# Patient Record
Sex: FEMALE | URBAN - METROPOLITAN AREA
[De-identification: names, ages, dates, MRNs, and addresses within clinical notes are randomized per-mention and may not be internally consistent; named-entity substitution may affect disease eponyms.]

---

## 2019-08-01 ENCOUNTER — TELEPHONE (OUTPATIENT)
Dept: ONCOLOGY | Age: 49
End: 2019-08-01

## 2019-08-23 ENCOUNTER — TELEPHONE (OUTPATIENT)
Dept: ONCOLOGY | Age: 49
End: 2019-08-23

## 2019-11-11 ENCOUNTER — TELEPHONE (OUTPATIENT)
Dept: ONCOLOGY | Age: 49
End: 2019-11-11

## 2020-02-04 ENCOUNTER — TELEPHONE (OUTPATIENT)
Dept: ONCOLOGY | Age: 50
End: 2020-02-04

## 2020-02-04 NOTE — TELEPHONE ENCOUNTER
Left message on voicemail advising patient of her missed appointment on 2/4/2020, 2nd no show letter sent out

## 2022-12-22 ENCOUNTER — HOME HEALTH ADMISSION (OUTPATIENT)
Dept: HOME HEALTH SERVICES | Facility: HOME HEALTH | Age: 52
End: 2022-12-22
Payer: COMMERCIAL

## 2022-12-24 ENCOUNTER — HOME CARE VISIT (OUTPATIENT)
Dept: HOME HEALTH SERVICES | Facility: HOME HEALTH | Age: 52
End: 2022-12-24
Payer: COMMERCIAL

## 2022-12-26 ENCOUNTER — HOME CARE VISIT (OUTPATIENT)
Dept: SCHEDULING | Facility: HOME HEALTH | Age: 52
End: 2022-12-26
Payer: COMMERCIAL

## 2022-12-26 VITALS
HEART RATE: 93 BPM | RESPIRATION RATE: 14 BRPM | OXYGEN SATURATION: 99 % | DIASTOLIC BLOOD PRESSURE: 80 MMHG | TEMPERATURE: 98.7 F | SYSTOLIC BLOOD PRESSURE: 130 MMHG

## 2022-12-26 PROCEDURE — G0151 HHCP-SERV OF PT,EA 15 MIN: HCPCS

## 2022-12-26 NOTE — HOME HEALTH
PMHx: List of Comorbidities: Anxiety, DM, HLD, GERD, HTN, Anemia, Hypothyroidism, back muscle spasms   Pt went into the hospital in Nov for sepsis and had a R AKA performed. Pt reported that she was in the hospital for a couple of weeks then went to Rehab for 4 weeks and came home last Fri    SUBJECTIVE: Pt and kaye reprort that pt came home in regular car and pt kaye bumped her up the front steps in [place].  reports that he is planning on building a ramp. Pt noted that she has 2 MD appointments this week and pt tabatha and boyfriend are going to assit getting her up and down the steps in [place]   LIVING SITUATION:  Pt lives with kaye in single level home with 4 steps to enter with 2 railings. Pt  works during the day, so pt is left a home for periods of time. Pt daughter and friends do call and check on pt   REQUIRES CAREGIVER ASSISTANCE FOR: transportation, medications, ADLS, IADLS   PLOF: Pt was amb with RW and cane. Pt was I with dressing and bathing  MEDICATIONS REVIEWED AND RECONCILED  Medications reconciled . Pt reports that the list for medications that she was given from the rehab is at the pharmacy She does have the floowing meds that are long standing meds in the home Flexeril, cymbalta, lyrica, ultram and ambien. Pt reports that she also takes insulin but as run out and does not have any in the home. She does not know if the Rehab has orderd her any medicaitons for pain. Pt  stated that he is going to  medicaitons today. There was a severe drug interaction between pt flexeril and tramadol  I called Dr Carmelina Page office and spoke with Ranjeet notifed her of the severe drug interaction and that pt did not have a list of the DC meds from the Rehab facility and that pt  has not yet picked any medications up from the pharmacy so I am not sure what medications pt should be taking.  Ranjeet stated that she would notify the nurse and they would call me back    NEXT MD APPT:  12/30/22 Dr Yolie De León vascular on 12/27/22 to have staples removed   ROM: R Risidual limb WFL L LE WFl except L foot pt has a Charcot foot was planning to have surgery on it when all this happened  STRENGTH: R hip flexors, extensors, ADD/ABD 4/5  L hip  flexors -3/5 L quads and hamstrings 3/5   WOUNDS:R risidual limb incision open to air. staples are intact along the incision line, no drainage noted. Slight erythema noted along lateral aspect on incision. Pt does have +2 pitting edema noted in L LE   BED MOBILITY:supine to sit and sit to supine with S   TRANSFERS:sliding board transfer bed<> wc with S/SBA increased time needed to complete task. Pt was I with sliding board management . Attempted sit to stand transfer with RW pt was unable to perform secondary to fear of falling, she did not have her L shoe available at time of transfer. Recommeded that it be used on next attempt   WC SKILL: Pt is I with locking and unlocking breaks. Pt was I with swing away arms rests. Pt was I in self propelling wc from bedroom to kitchen using B UE to self propel wc increased time needed to complete task. Pt L LE does not touch the floor when sitting in the WC as pt has 2 cushions in chair. Recommeded the removal of one of the cushions to see if L LE would then touch the floor. Pt refused at this time. Pt was unable to self propel wc to front door secondary to increased clutter in living room. Recommeded for safety that pathway be made so wc and fit through. Recommeded for safety that clutter near pt bed to moved so that she has more room to get wc in closer to bed without having to fight clutter   STAIRS:NA Family bumbs pt up and down steps in [place]   BALANCE: Pt was able to maintain a static symmetrical sitting posture on the EOB with U UE support   PATIENT RESPONE TO TX: Pt pain level remained the same throughout tx session   PATIENT LEVEL OF UNDERSTANDING OF EDUCATION PROVIDED Educated pt to use wc as primary means of mobilty.  Recommeded that pt carry cellphone with her at all times for safety   PATIENT EDUCATION PROVIDED THIS VISIT: safety, HEP, walking, deep breathing, Recommeded that all pathways pt uses to be free and clear of clutter and to make a pathway to front door so pt is able to self propell herself to it for safety. Recommeded that pt elevate B LE throughout the day to assist with swelling. Discussed pressure relieving techniques and changing postion every Hr . Reinforced the improtance of picking pt meds up today from the pharmacy   HEP consisting of:  1. Change positon every HR for pressure relief. 2. supine therex L LE AP,QS,HS,Hamstrings sets, SAQ, gluteal squeezes 3 daily x 10  Written HEP issued, patient/caregiver verbalized understanding. CONTINUED NEED FOR THE FOLLOWING SKILLS: HH PT is medically necessary to *address pain, decreased ROM, decreased strength, increased swelling, impaired bed mobility, decreased independence with functional transfers, , and impaired balance in order to improve functional independence, quality of life, return to PLOF, and reduce the risk for falls. ASSESSMENT: Pt presents with decreased stength B LE. A needed for all transfers. Pt is able to self propel wc in home limited distances secondary to clutter recommeded that pathways cleared of clutter. Pt is currently unable to stand to assist with presure relief and transfers. OT has been orderd to assess ADLS and IADLS . SN has been ordered to assist with medication managment, educated in disease managment  PLAN: Pt will be seen to establish and upgrade HEP. Bed mobility training, transfer training. Reinforece general safety precautions. DISCHARGE PLANNING DISCUSSED: Discharge to self and family under MD supervision once all goals have been met or patient has reached max potential. Patient/caregiver verbalized understanding. Elvin Peralta NP and .  Medications reconciled . Pt reports that the list for medications that she was given from the rehab is at the pharmacy She does have the floowing meds that are long standing meds in the home Flexeril, cymbalta, lyrica, ultram and ambien. Pt reports that she also takes insulin but as run out and does not have any in the home. She does not know if the Rehab has orderd her any medicaitons for pain. Pt  stated that he is going to  medicaitons today. There was a severe drug interaction between pt flexeril and tramadol  I called Dr Miky Lopez office and spoke with Ranjeet notifed her of the severe drug interaction and that pt did not have a list of the DC meds from the Rehab facility and that pt  has not yet picked any medications up from the pharmacy so I am not sure what medications pt should be taking. Ranjeet stated that she would notify the nurse and they would call me back. Pt presents with decreased stength B LE. A needed for all transfers. Pt is able to self propel wc in home limited distances secondary to clutter recommeded that pathways cleared of clutter. Pt is currently unable to stand to assist with presure relief and transfers. OT has been orderd to assess ADLS and IADLS .  SN has been ordered to assist with medication managment, educated in disease management

## 2022-12-26 NOTE — Clinical Note
Discussed PLOC with LPTA working on increasing strength B LE. Work on bed mobility skills. Work on transfer training  to include stand piviot transfers. Work on standing balance with RW for pressure relief.  Educate and reinforce safety awareness

## 2022-12-27 ENCOUNTER — HOME CARE VISIT (OUTPATIENT)
Dept: HOME HEALTH SERVICES | Facility: HOME HEALTH | Age: 52
End: 2022-12-27
Payer: COMMERCIAL

## 2022-12-28 ENCOUNTER — HOME CARE VISIT (OUTPATIENT)
Dept: SCHEDULING | Facility: HOME HEALTH | Age: 52
End: 2022-12-28
Payer: COMMERCIAL

## 2022-12-28 PROCEDURE — G0157 HHC PT ASSISTANT EA 15: HCPCS

## 2022-12-28 NOTE — Clinical Note
thanks for the update  ----- Message -----  From: Tristan Busby PTA  Sent: 12/29/2022   7:26 AM EST  To: Betito Tracy PTA, Allyn Capone, PT      The pt had 2 falls since Monday, Also, her BP was 180/90 today. I spoke with Claude Jordan at Dr. Morteza Culver office to alert of both issues. She did have to reschedule her appt with him from Friday to next week as her  could not get off work.

## 2022-12-28 NOTE — Clinical Note
added. Thanks   ----- Message -----  From: Roslyn Peace PTA  Sent: 12/29/2022   7:26 AM EST  To: Claire Morales, PT      Losartan Potassium 25 MG take 1 tablet by mouth once daily for HTN  Ferrous Sulfate 325 MG tablet take 1 tablet by mouth twice a day  Furosemide 20 MG tablet take 1 tablet by mouth every day  Duloxetine HCL DR 60 MG capsule take 1 capsule by mouth every day  Atorvastatin 40 MG tablet take 1 tablet by mouth everyday at bedtime  Vitamin D2 1.25 MG (50,000Unit) Take 1 capsule by mouth every Sunday for Vitamin deficiency  Doxycycline Hyclate 100 MG capsule Take 1 tablet by mouth twice daily  Sodium Bicarb 650 MG tablet Give 1 talet by mouth twice a day for indigestion  Levothyroxine MCG tablet Take 1 tablet by mouth everyday  Vitamin B-1 100 MG Tablet Take 1 tablet by mouth every day for vitamin deficiency  Cephalexin 500 MG capsule Take 1 tablet by mouth every 12 hours

## 2022-12-28 NOTE — Clinical Note
The pt had 2 falls since Monday, Also, her BP was 180/90 today. I spoke with Marj Mistry at Dr. Darian Bee office to alert of both issues. She did have to reschedule her appt with him from Friday to next week as her  could not get off work.

## 2022-12-29 VITALS
TEMPERATURE: 97.7 F | DIASTOLIC BLOOD PRESSURE: 90 MMHG | SYSTOLIC BLOOD PRESSURE: 180 MMHG | OXYGEN SATURATION: 99 % | HEART RATE: 86 BPM

## 2022-12-29 NOTE — HOME HEALTH
SUBJECTIVE: The pt reports 2 falls since the last visit. One fall when she was transferring to the car. She states she attempted a stand/pivot transfer vs a sliding board transfer and her LLE gave way. She was able to get up with the assistance of her family. No injury. The other fall was when she was transferring from the toilet bench to the [facility]. She grabbed on to the shower door vs pushing from the bench and the shower door moved. She states she was able to get up with the assistance of her . No injury. Bridget Coy PAIN: see pain assessment  OBJECTIVE: see interventions  . NEXT MD APPT: next week with Dr. Karina Bernabe. Bridget Coy CAREGIVER ASSISTANCE NEEDED FOR: The pt lives with her  and has a daughter that lives nearby to assist with care as needed: medication management, transportation, shopping, meals, safety. .  ASSESSMENT AND PROGRESS TOWARD GOALS:  The pt demonstrated a fair result in HHPT on this date due to limited participation from increased BP and self limiting characteristics as she refused to review the toilet transfer to review the improved safety awareness. She now has her medications to assist with BP management and is able to perform transfers out of the bed with the sliding board with S. She does have some difficulty with removing the board from under her. She will benefit from continued HHPT to improve the safety with functional mobility to reduce future risks of falls and rehospitalizations. PATIENT RESPONSE TO TREATMENT: Improved medication awareness post education. PATIENT LEVEL OF UNDERSTANDING OF EDUCATION: Good - the pt able to teach back the safety of NOT using the shower door to assist with transfers to and from the toilet bench.    .  CONTINUED NEED FOR THE FOLLOWING SKILLS: HH PT is medically necessary to address  pain, decreased ROM, decreased strength, increased swelling, impaired bed mobility, decreased independence with functional transfers, impaired gait, impaired stair negotiation, and impaired balance in order to improve functional independence, quality of life, return to PLOF, reduce the risk for falls, and reduce pain. [place] LAST COMPLETED ON:  The pts status and POC discussed with Hugo Kelley PT 12/27/22 and today. Stacey Copping PLAN: Plan to review tranfers next visit. PFA with Hugo Kelley PT.   DISCHARGE PLANNING DISCUSSED: Discharge to self and family under MD supervision once all goals have been met or patient has reached maximum potential. Discussed with the pt the POC to continue HHPT with the remaining frequency of 2w3. The pt is in agreement to the POC at this time.

## 2022-12-29 NOTE — CASE COMMUNICATION
Losartan Potassium 25 MG take 1 tablet by mouth once daily for HTN  Ferrous Sulfate 325 MG tablet take 1 tablet by mouth twice a day  Furosemide 20 MG tablet take 1 tablet by mouth every day  Duloxetine HCL DR 60 MG capsule take 1 capsule by mouth every day  Atorvastatin 40 MG tablet take 1 tablet by mouth everyday at bedtime  Vitamin D2 1.25 MG (50,000Unit) Take 1 capsule by mouth every Sunday for Vitamin deficiency  Doxycycline Hyclat e 100 MG capsule Take 1 tablet by mouth twice daily  Sodium Bicarb 650 MG tablet Give 1 talet by mouth twice a day for indigestion  Levothyroxine MCG tablet Take 1 tablet by mouth everyday  Vitamin B-1 100 MG Tablet Take 1 tablet by mouth every day for vitamin deficiency  Cephalexin 500 MG capsule Take 1 tablet by mouth every 12 hours

## 2022-12-31 ENCOUNTER — HOME CARE VISIT (OUTPATIENT)
Dept: HOME HEALTH SERVICES | Facility: HOME HEALTH | Age: 52
End: 2022-12-31
Payer: COMMERCIAL

## 2022-12-31 NOTE — CASE COMMUNICATION
I called last night about appointment today for SN Initail assessment. We agreed on 12:30-1:30 PM as time for visit. I arrived at 13:15 and no answer at the door. I called both numbers avalable to me and left messages, no return call.  told.

## 2023-01-01 ENCOUNTER — HOME CARE VISIT (OUTPATIENT)
Dept: SCHEDULING | Facility: HOME HEALTH | Age: 53
End: 2023-01-01
Payer: COMMERCIAL

## 2023-01-01 PROCEDURE — G0299 HHS/HOSPICE OF RN EA 15 MIN: HCPCS

## 2023-01-02 VITALS
OXYGEN SATURATION: 99 % | HEART RATE: 78 BPM | SYSTOLIC BLOOD PRESSURE: 140 MMHG | DIASTOLIC BLOOD PRESSURE: 80 MMHG | RESPIRATION RATE: 16 BRPM | TEMPERATURE: 98.6 F

## 2023-01-02 NOTE — HOME HEALTH
Caregiver involvement:  is caregiver, he is available most days and all evenings, he  Assists with ADLs, Medication management, Transportation to appointments, Meal prep and assists with transfers. .    Medications reconciled and all medications are available in the home this visit. The following education was provided regarding medications,  Reminded to take all prescribed meds as directed and at the same time each day. Medications  are effective at this time. Home health supplies by type and quantity ordered/delivered this visit include: n/a    Patient education provided this visit:I reviewed the importance of regular blood sugar monitoring for good blood sugar control. Patient instructed to follow with diabetic diet- monitoring sugar intake, limiting foods with high sugar content. MEDICATION ADHERENCE IS AN IMPORTANT COMPONENT OF HTN MANAGEMENT. PATIENT STATES THE IMPORTANCE TO TAKE HTN MEDS SAME TIME EACH DAY. Continue a heart Healthy ADA diet. Watch out for high sodium foods, read labels. Observe for signs of infection. Monitor B/P, take meds as ordered and f/u with PCP. Read labels of foods, stay away from high sodium canned foods and processed meats. Patient is a fall risk, went over the need of having someone with her when transferring with slide board, keep hallways and living areas free of clutter and throw rugs. Educated patient about the quitting smoking, that it is bad for your health and respiratory status. Cigarette smoke harms nearly every organ in your body, causes many diseases, and reduces the health of smokers in general.      Progress toward goals:BS under good control, Right AKA site is healed, no dressings or wound care needed. Home exercise program: Discussed s/s of infection to monitor for, s/s of UTI, who to report to/when. Instructed cg to notify staff/md/seek tx if complications occur.  Patient instructed to maintain clear pathways in home and to minimize clutter to prevent falls from occurring/minimize fall potential.   Patient needing a well balanced diet with the 5 food groups, patient to increase fiber in diet, fresh fruits and vegetables, whole grains and increase water intake. Needs the assistance of one other for safe transfers to bed/chair etc.  Maintain healthy low sodium ADA diet, Continue to monitor B/P and Blood sugars, Observe for signs of infection. Work with PT to increase strength and f/u with PCP. I went over the importance of taking all prescriptions as ordered. I discussed how to avoid extra sodium in her diet. We discussed the signs of infection and when to call MD.  We discussed the high risk for falls and ways to prevent falls in the future. We discussed taking B/P daily and keeping a log. We also discussed the need of a heart healthy ADA diet, and the need to change positions frequently. Gaining strength with PT for increased mobility. Patient is on slididng scale with Humalog I went over the need to understand the signs of hypoglycemia and hyperglycemia, also to check sugar on a daily basis. Rotate injections to different areas of body, dispose of syringes in a non-porus container. Clinician instructed patient/CG on proper disposal of sharps: Containers should be made of hard plastic, be puncture-resistant and leakproof, such as a laundry detergent or bleach bottle.  When the container is ¾ full, it should be sealed with tape and labeled. DO NOT RECYCLE prior to discarding in the regular trash. Amaya Irvin is also taking Ultram for pain, I  went over the danger of opioid's addictive nature as well as propensity for respiratory depression. I contacted Dr. Manuel Isaacs by Fax to let hime know SN Initial Assessment was completed. Continued need for the following skills: Nursing, Physical Therapy and Occupational Therapy    The following discharge planning was discussed with the pt/caregiver:  Will discharge from nursing when education is complete and patient is medically stable.

## 2023-01-04 ENCOUNTER — HOME CARE VISIT (OUTPATIENT)
Dept: SCHEDULING | Facility: HOME HEALTH | Age: 53
End: 2023-01-04
Payer: COMMERCIAL

## 2023-01-04 PROCEDURE — G0157 HHC PT ASSISTANT EA 15: HCPCS

## 2023-01-05 VITALS
SYSTOLIC BLOOD PRESSURE: 158 MMHG | HEART RATE: 92 BPM | OXYGEN SATURATION: 98 % | DIASTOLIC BLOOD PRESSURE: 80 MMHG | TEMPERATURE: 97.4 F

## 2023-01-05 NOTE — HOME HEALTH
SUBJECTIVE: The pt reports her electricity has been shut off due to lack of payment. Kishore Martha PAIN: see pain assessment    OBJECTIVE: see interventions  . NEXT MD APPT: [facility], the pt states that her appt that was rescheduled for this week has been canceled. Kishore Martha CAREGIVER ASSISTANCE NEEDED FOR: The pt lives with her  and has a daughter that lives nearby to assist with care as needed: medication management, transportation, shopping, meals, safety. .    ASSESSMENT AND PROGRESS TOWARD GOALS: The pt continues to be at increased risks of falls due to environmental conditions. Her home is filled with clutter and now does not have proper lighting. She did demonstrate an improvement with bed mobility and the ability to participate in ther exs without an increase in pain. She demonstrated good technique with the bed<>WC squat/pivot transfers on this date. She may benefit from a MSW due to the electricity being shut off for possible community resources to assist.     PATIENT RESPONSE TO TREATMENT: no increase in pain with ther exs    PATIENT LEVEL OF UNDERSTANDING OF EDUCATION: Good, but no effort being made for the reduction of a smoking cessation at this time. .    CONTINUED NEED FOR THE FOLLOWING SKILLS: HH PT is medically necessary to address  pain, decreased ROM, decreased strength, increased swelling, impaired bed mobility, decreased independence with functional transfers, impaired gait, impaired stair negotiation, and impaired balance in order to improve functional independence, quality of life, return to PLOF, reduce the risk for falls, and reduce pain. Kishore Martha PLAN:  PFA with Jagdish Sanchez PT.     DISCHARGE PLANNING DISCUSSED: Discharge to self and family under MD supervision once all goals have been met or patient has reached maximum potential. Discussed with the pt the POC to continue HHPT with the remaining frequency of 2w3. The pt is in agreement to the POC at this time.

## 2023-01-06 ENCOUNTER — HOME CARE VISIT (OUTPATIENT)
Dept: HOME HEALTH SERVICES | Facility: HOME HEALTH | Age: 53
End: 2023-01-06
Payer: COMMERCIAL

## 2023-01-06 ENCOUNTER — HOME CARE VISIT (OUTPATIENT)
Dept: SCHEDULING | Facility: HOME HEALTH | Age: 53
End: 2023-01-06
Payer: COMMERCIAL

## 2023-01-06 VITALS
OXYGEN SATURATION: 98 % | RESPIRATION RATE: 14 BRPM | SYSTOLIC BLOOD PRESSURE: 126 MMHG | TEMPERATURE: 97.3 F | DIASTOLIC BLOOD PRESSURE: 70 MMHG | HEART RATE: 90 BPM

## 2023-01-06 PROCEDURE — G0151 HHCP-SERV OF PT,EA 15 MIN: HCPCS

## 2023-01-06 NOTE — HOME HEALTH
Date and Time of Fall: 12/27/22  SOC/JV Date: 12/26/22  Fall observed by [facility] Staff? no   Describe Event and Document any re-training or treatment plan modifications indicated:  She states she attempted a stand/pivot transfer vs a sliding board transfer and her LLE gave way. She was able to get up with the assistance of her family. No injury. The other fall was when she was transferring from the toilet bench to the [facility]. She grabbed on to the shower door vs pushing from the bench and the shower door moved. She states she was able to get up with the assistance of her . No injury. Response to re-training or treatment plan modifications: Educated pt to use sliding board for car transfers for safety. Also educated pt not to hold not to hold on to shower door when transfering as this was a safety issuse as pt moves. Pt verbalized understanding   Assisted Devices used by patient prior to fall: WC and sliding board   Was equipment in use at time of fall?  wc,sliding board   Injury (Yes / No), If yes, describe:  No   Emergent Care Received: (Yes / No), if yes, describe: NO  Was patient identified as High Risk for falls? Yes   List Tests Performed, Scores of Tests, and Patient Risk Factors: [facility] MARIA FERNANDA JOSE Notified (name and time): Dr. Saul Minor  office was notifed of fall on 12/28/22 by LPTA she spoke with [place]. SUBJECTIVE: I am doing ok. No recent fall. Pt requested a MSW consult to assist with community resources to assist with community resources as pt heat was recently cut off. I told her that I would request orders   REQUIRES CAREGIVER ASSISTANCE FOR: transportation, ADLS, IADLS   MEDICATIONS REVIEWED AND RECONCILED no changes   NEXT MD APPT: Dr. Saul Minor TBD   ROM:B LE WFL   STRENGTH:   R hip throughout 4+/5  L hip flex 4/5  L knne exr 4-/5  L knee flex 4+/5  L hip add/add 4+/5    WOUNDS: staples removed last week. R residual limb incison line intact well approximated. No signs or symptoms of infection noted.   BED MOBILITY:supine<> sit MOD I  TRANSFERS:Modified bump transfer bed<> wc MOD I demostrated proper technique and sequencing   WC SKILLS : pt was able to I self propell wc using B UE 50ft x 2 from bedroom to the front door and back. Pt was I turning wc around and I with locking and unlocking breaks   STAIRS:NA  BALANCE: Pt was able to maintain a static symetrical sitting posture on the EOB without B UE support MOD I. Pt refused static standing today with walker secondary to pain in L LE. PATIENT RESPONE TO TX: Pt pain level remained the same throughout tx session   PATIENT LEVEL OF UNDERSTANDING OF EDUCATION PROVIDED  Pt pain level remained the same troughout tx sessiion   PATIENT EDUCATION PROVIDED THIS VISIT: safety, HEP, walking, deep breathing, Reinforced use of sliding board for all car transfers. Reinforced that pt is never to hold on the shower door to assist with transfers as this is a safety risks as door moved . Pt faith did clear clutter in pt room so wc fit closer to the bed and he did clear a pathway to the front door so it is able to propell her wc there     HEP consisting of:  1) change positions every 30 mins  2) APs frequently  3) smoking cessation  Written HEP issued, patient/caregiver verbalized understanding. CONTINUED NEED FOR THE FOLLOWING SKILLS: HH PT is medically necessary to address pain,, decreased strength, increased swelling, impaired, decreased independence with functional transfers, impaired gait, impaired stair negotiation, and impaired balance in order to improve functional independence, quality of life, return to PLOF, and reduce the risk for falls. ASSESSMENT:  Pt has made some progress in theapy. On [facility]  12/26/22 AROM was R Risidual limb WFL L LE WFl except L foot pt has a Charcot foot was planning to have surgery on it when all this happened.  On SOC strength was R hip flexors, extensors, ADD/ABD 4/5  L hip  flexors -3/5 L quads and hamstrings 3/5 Today on Re Assessment strength is R hip throughout 4+/5  L hip flex 4/5  L knne exr 4-/5  L knee flex 4+/5  L hip add/add 4+/5  On SOC bed mobility was supine to sit and sit to supine with S.  Today at Re Assessment bed mobility is supine<> sit MOD I. Goal has been met. On [facility] transfers were sliding board transfer bed<> wc with S/SBA increased time needed to complete task. Pt was I with sliding board management . Attempted sit to stand transfer with RW pt was unable to perform secondary to fear of falling, she did not have her L shoe available at time of transfer. Recommeded that it be used on next attempt . Today at Re Assessment transfers are Modified bump transfer bed<> wc MOD I demostrated proper technique and sequencing. On [facility] WC skills were  Pt is I with locking and unlocking breaks. Pt was I with swing away arms rests. Pt was I in self propelling wc from bedroom to kitchen using B UE to self propel wc increased time needed to complete task. Pt L LE does not touch the floor when sitting in the WC as pt has 2 cushions in chair. Recommeded the removal of one of the cushions to see if L LE would then touch the floor. Pt refused at this time. Pt was unable to self propel wc to front door secondary to increased clutter in living room. Recommeded for safety that pathway be made so wc and fit through. Recommeded for safety that clutter near pt bed to moved so that she has more room to get wc in closer to bed without having to fight clutter. Today at Re Assessment WC skill are pt was able to I self propell wc using B UE 50ft x 2 from bedroom to the front door and back. Pt was I turning wc around and I with locking and unlocking breaks . On SOC stairs were NA Family bumbs pt up and down steps in wc  Today on Re Assessment stairs are NA  On SOC Pt was able to maintain a static symmetrical sitting posture on the EOB with U UE support .   Today at Re Assessment balance is Pt was able to maintain a static symetrical sitting posture on the EOB without B UE support MOD I. Pt refused static standing today with walker secondary to pain in L LE. Pt goals have been updated. Pt would cont to benefit from HHPT services to cont to work on strength training B LE. Begin static standing training and to begin standing <> steps transfers all to assist with pt getting to MD appointment. Also to cont to reinforce general safety awareness. I will request orders for MSW to assist with community resources   PLAN: standing<> step transfers, static standing with RW. cont with strength training B LE   DISCHARGE PLANNING DISCUSSED: Discharge to self and family under MD supervision once all goals have been met or patient has reached max potential. Patient/caregiver verbalized understanding. No

## 2023-01-07 ENCOUNTER — HOME CARE VISIT (OUTPATIENT)
Dept: SCHEDULING | Facility: HOME HEALTH | Age: 53
End: 2023-01-07
Payer: COMMERCIAL

## 2023-01-07 PROCEDURE — G0299 HHS/HOSPICE OF RN EA 15 MIN: HCPCS

## 2023-01-09 ENCOUNTER — HOME CARE VISIT (OUTPATIENT)
Dept: SCHEDULING | Facility: HOME HEALTH | Age: 53
End: 2023-01-09
Payer: COMMERCIAL

## 2023-01-09 ENCOUNTER — HOME CARE VISIT (OUTPATIENT)
Dept: HOME HEALTH SERVICES | Facility: HOME HEALTH | Age: 53
End: 2023-01-09
Payer: COMMERCIAL

## 2023-01-09 VITALS
DIASTOLIC BLOOD PRESSURE: 72 MMHG | SYSTOLIC BLOOD PRESSURE: 120 MMHG | RESPIRATION RATE: 16 BRPM | TEMPERATURE: 97.2 F | HEART RATE: 80 BPM | OXYGEN SATURATION: 98 %

## 2023-01-09 VITALS
SYSTOLIC BLOOD PRESSURE: 150 MMHG | TEMPERATURE: 97.3 F | DIASTOLIC BLOOD PRESSURE: 80 MMHG | OXYGEN SATURATION: 7 % | HEART RATE: 90 BPM

## 2023-01-09 PROCEDURE — G0299 HHS/HOSPICE OF RN EA 15 MIN: HCPCS

## 2023-01-09 PROCEDURE — G0157 HHC PT ASSISTANT EA 15: HCPCS

## 2023-01-09 NOTE — HOME HEALTH
SUBJECTIVE:   Pt explains that there is still no heat or electricity and has no scheduled plans of getting that fixed. States honeybuns or poptarts are her choices for breakfast. Pt explains that surgery has not been scheduled and no current treatments for the L foot. Pt also explains that she has diarrhea but no vomiting. She complains of diarrhea and pain in abdomen. Chuck Mancuso PATIENT EDUCATION: pt education in regard to the need for following a diabetic diet. Chuck Mancuso PAIN: see pain assessment. OBJECTIVE: See interventions. Chuck LI MD APPT: [facility], the pt states that her appt that was rescheduled for this week has been canceled. Chuck Mancuso CAREGIVER ASSISTANCE NEEDED FOR: The pt lives with her  and has a daughter that lives nearby to assist with care as needed: medication management, transportation, shopping, meals, safety. .  ASSESSMENT AND PROGRESS TOWARD GOALS: Pt demonstrated a positive result in HHPT on this date as evidence of transfers from bed to [facility] with mod(I) and consistently performes bed mobility with mod(I), but continues to require mod(A) for Stand to sit to RW and CGA for stand to sit to the WC. The pt will benefit from continued HHPT to increase strength, balance, and edurance to reduce the risks of falls and overall burden of care. The pt will also benefit from continued education for improving her overall health. PATIENT RESPONSE TO TREATMENT: 7/10 pain level on the VAS in the LLE pre PT, 10/10 pain level on LLE with standing. PATIENT LEVEL OF UNDERSTANDING OF EDUCATION: Pt is understanding of diabetic diet needs, but does not show motivation for change.    .  CONTINUED NEED FOR THE FOLLOWING SKILLS: HH PT is medically necessary to address  pain, decreased ROM, decreased strength, decreased independence with functional transfers, impaired gait, impaired stair negotiation, and impaired balance in order to improve functional independence, quality of life, return to PLOF, reduce the risk for falls, and reduce pain. Billy Coffman PLAN:  Next visit will focus on balance and strength of LLE to reduce the risks of falls with transfers and improve the ability to stand at the walker for stand/pivot transfers. DISCHARGE PLANNING DISCUSSED: Discharge to self and family under MD supervision once all goals have been met or patient has reached maximum potential. Discussed with the pt the POC to continue HHPT with the remaining frequency of 1 more visti this week then 2w2. The pt is in agreement to the POC at this time.

## 2023-01-09 NOTE — HOME HEALTH
Caregiver involvement:  is caregiver, he is available most days and all evenings, he  Assists with ADLs, Medication management, Transportation to appointments, Meal prep and assists with transfers. .    Medications reconciled and all medications are available in the home this visit. The following education was provided regarding medications,  Reminded to take all prescribed meds as directed and at the same time each day. Medications  are effective at this time. Home health supplies by type and quantity ordered/delivered this visit include: n/a    Patient education provided this visit:I reviewed the importance of regular blood sugar monitoring for good blood sugar control. Patient instructed to follow with diabetic diet- monitoring sugar intake, limiting foods with high sugar content. MEDICATION ADHERENCE IS AN IMPORTANT COMPONENT OF HTN MANAGEMENT. PATIENT STATES THE IMPORTANCE TO TAKE HTN MEDS SAME TIME EACH DAY. Continue a heart Healthy ADA diet. Watch out for high sodium foods, read labels. Observe for signs of infection. Monitor B/P, take meds as ordered and f/u with PCP. Read labels of foods, stay away from high sodium canned foods and processed meats. Patient is a fall risk, went over the need of having someone with her when transferring with slide board, keep hallways and living areas free of clutter and throw rugs. Educated patient about the quitting smoking, that it is bad for your health and respiratory status. Cigarette smoke harms nearly every organ in your body, causes many diseases, and reduces the health of smokers in general.  PT has requested a MSW consult, as patient's power is off and there is no heat in home at present. Progress toward goals:BS under good control, Right AKA site is healed, no dressings or wound care needed. Home exercise program: Discussed s/s of infection to monitor for, s/s of UTI, who to report to/when.  Instructed cg to notify staff/md/seek tx if complications occur. Patient instructed to maintain clear pathways in home and to minimize clutter to prevent falls from occurring/minimize fall potential.   Patient needing a well balanced diet with the 5 food groups, patient to increase fiber in diet, fresh fruits and vegetables, whole grains and increase water intake. Needs the assistance of one other for safe transfers to bed/chair etc.  Maintain healthy low sodium ADA diet, Continue to monitor B/P and Blood sugars, Observe for signs of infection. Work with PT to increase strength and f/u with PCP. I went over the importance of taking all prescriptions as ordered. I discussed how to avoid extra sodium in her diet. We discussed the signs of infection and when to call MD.  We discussed the high risk for falls and ways to prevent falls in the future. We discussed taking B/P daily and keeping a log. We also discussed the need of a heart healthy ADA diet, and the need to change positions frequently. Gaining strength with PT for increased mobility. Patient is on slididng scale with Humalog I went over the need to understand the signs of hypoglycemia and hyperglycemia, also to check sugar on a daily basis. Rotate injections to different areas of body, dispose of syringes in a non-porus container. Clinician instructed patient/CG on proper disposal of sharps: Containers should be made of hard plastic, be puncture-resistant and leakproof, such as a laundry detergent or bleach bottle.  When the container is ¾ full, it should be sealed with tape and labeled. DO NOT RECYCLE prior to discarding in the regular trash. Melissa Waterman is also taking Ultram for pain, I  went over the danger of opioid's addictive nature as well as propensity for respiratory depression. Continued need for the following skills: Nursing, Physical Therapy and Occupational Therapy. The following discharge planning was discussed with the pt/caregiver:  Will discharge from nursing when education is complete and patient is medically stable.

## 2023-01-11 ENCOUNTER — HOME CARE VISIT (OUTPATIENT)
Dept: HOME HEALTH SERVICES | Facility: HOME HEALTH | Age: 53
End: 2023-01-11
Payer: COMMERCIAL

## 2023-01-11 ENCOUNTER — HOME CARE VISIT (OUTPATIENT)
Dept: SCHEDULING | Facility: HOME HEALTH | Age: 53
End: 2023-01-11
Payer: COMMERCIAL

## 2023-01-12 ENCOUNTER — HOME CARE VISIT (OUTPATIENT)
Dept: SCHEDULING | Facility: HOME HEALTH | Age: 53
End: 2023-01-12
Payer: COMMERCIAL

## 2023-01-12 PROCEDURE — G0299 HHS/HOSPICE OF RN EA 15 MIN: HCPCS

## 2023-01-13 VITALS
TEMPERATURE: 98.7 F | RESPIRATION RATE: 16 BRPM | SYSTOLIC BLOOD PRESSURE: 122 MMHG | OXYGEN SATURATION: 96 % | DIASTOLIC BLOOD PRESSURE: 74 MMHG | HEART RATE: 90 BPM

## 2023-01-13 NOTE — HOME HEALTH
Skilled reason for visit: nursing assessment, vs,pain, medications and living conditions      patients cg is spouse and he is available as needed or assistance with IADL's, ADL's, meal prep, medication management, and taking patient to all doctors appointments. Medications reviewed and all medications are available in the home this visit. The following education was provided regarding medications, medication interactions, and look alike medications (specify): insulin  Medications are effective at this time. Medications reconciled and all meds in home    Home health supplies by type and quantity ordered/delivered this visit include: PT [address]      Patient education provided this visit: pt has no electricity or heat. Pt is laying in bed, cold and unable to get food. Her  is at work during the day and doesnt get home till after 5pm she verb. She stated that when her  gets home, he brings her food. Pt stated they owed over $700 to the electric company and cant get it turned back on. Pt was eating honey buns and was smoking cigarettes. Pt made a comment that the last time they went a long time without electricity, so she has been down this road before. Patient level of understanding of education provided: pt is understanding of all procedures performed.      Skilled Care Performed this visit: vs, pain, meds, nursing assessment    Patient response to procedure performed: pt tolerated well    Agency Progress toward goals: will reassess next visit    Patient's Progress towards personal goals: pt is not making progress due to living conditions    Home exercise program: continue home exercises program as developed by physical therapy     Continued need for the following skills: Nursing and Physical Therapy    Plan for next visit: nursing assessment and possible discharge    Patient and/or caregiver notified and agrees to changes in the Plan of Care YES      The following discharge planning was discussed with the pt/caregiver: Patient will be discharged once education has completed, patient is medically stable and pt/cg are able to independently manage medications and disease process.

## 2023-01-15 VITALS
RESPIRATION RATE: 16 BRPM | TEMPERATURE: 97.5 F | DIASTOLIC BLOOD PRESSURE: 72 MMHG | HEART RATE: 88 BPM | OXYGEN SATURATION: 97 % | SYSTOLIC BLOOD PRESSURE: 122 MMHG

## 2023-01-15 NOTE — HOME HEALTH
Skilled reason for visit: discipline discharge, nursing assessment, vs,pain, medications and living conditions              patients cg is spouse and he is available as needed or assistance with IADL's, ADL's, meal prep, medication management, and taking patient to all doctors appointments. Medications reviewed and all medications are available in the home this visit. The following education was provided regarding medications, medication interactions, and look alike medications (specify): insulin, cymbalta, lasix, lyrica    Medications are effective at this time. Medications reconciled and all meds in home         Home health supplies by type and quantity ordered/delivered this visit include: n/a              Patient education provided this visit: see interventions       Patient level of understanding of education provided: pt is understanding of all procedures performed. Skilled Care Performed this visit: vs, pain, meds, nursing assessment and discipline dc         Patient response to procedure performed: pt tolerated well         Agency Progress toward goals: pt unable to perform goals due to living conditions        Patient's Progress towards personal goals: pt is not making progress due to living conditions         Home exercise program: continue home exercises program as developed by physical therapy          Continued need for the following skills:  Physical Therapy         Plan for next visit: n/a         Patient and/or caregiver notified and agrees to changes in the Plan of Care YES           The following discharge planning was discussed with the pt/caregiver: Patient will be discharged today.

## 2023-01-16 ENCOUNTER — HOME CARE VISIT (OUTPATIENT)
Dept: SCHEDULING | Facility: HOME HEALTH | Age: 53
End: 2023-01-16
Payer: COMMERCIAL

## 2023-01-16 PROCEDURE — G0152 HHCP-SERV OF OT,EA 15 MIN: HCPCS

## 2023-01-16 NOTE — Clinical Note
thanks for the update  ----- Message -----  From: Yan Dobson OT  Sent: 1/17/2023   8:47 AM EST  To: Andreina Hansen PTA, Elizabeth Nur RN, *      Occupational Therapy Evaluation    3w1, 2w1, 1w1    Ms. Barb Carpio is unable to ambulate due to cluttered walkways and unsafe walking surfaces in addition to her R AKA. She uses a w/c for all mobility and is able to perform squat pivot transfers from the bed to the w/c and sliding toilet bench over commode with CGA. Cues needed to ensure breaks are locked on w/c and to keep walkways clear from clutter to reduce falls. Ms. Barb Carpio reports that she has no electricity due to outstanding bill. Her spouse attaches a generator in the evenings (4pm-10pm) to use the electric stove, charge cell phone and use space heaters. She uses the electric stove to heat up water in the evenings for sponge bathing. Her spouse is at work during the day and pt is unable to perform full ADL at this time due to frigid water. She is agreeable to work with mock set up of her ADL with OT as she reports history of falls during self care but not since start of care. She reports that she needs assistance to wash sameer areas due to limited standing tolerance and balance. She is able to wipe self while seated on the commode. There is no sink in the bathroom. It has been removed to allow w/c access to the toilet. Ms. Barb Carpio reports that she requires assistance with LB ADL to include toileting, LB bathing and LB dressing due to limited space and decreased balance. Will provide ongoing skilled OT visits to assist pt with creating a routine that pt can carryover with increased safety and independence to reduce risk of falls and burden on caregiver.

## 2023-01-17 VITALS
RESPIRATION RATE: 17 BRPM | SYSTOLIC BLOOD PRESSURE: 136 MMHG | DIASTOLIC BLOOD PRESSURE: 88 MMHG | OXYGEN SATURATION: 100 % | TEMPERATURE: 97.3 F | HEART RATE: 97 BPM

## 2023-01-17 NOTE — CASE COMMUNICATION
Occupational Therapy Evaluation    3w1, 2w1, 1w1    Ms. Lisa Ortiz is unable to ambulate due to cluttered walkways and unsafe walking surfaces in addition to her R AKA. She uses a w/c for all mobility and is able to perform squat pivot transfers from the bed to the w/c and sliding toilet bench over commode with CGA. Cues needed to ensure breaks are locked on w/c and to keep walkways clear from clutter to reduce falls. Ms. Lisa Ortiz reports th at she has no electricity due to outstanding bill. Her spouse attaches a generator in the evenings (4pm-10pm) to use the electric stove, charge cell phone and use space heaters. She uses the electric stove to heat up water in the evenings for sponge bathing. Her spouse is at work during the day and pt is unable to perform full ADL at this time due to frigid water. She is agreeable to work with mock set up of her ADL with OT as she r eports history of falls during self care but not since start of care. She reports that she needs assistance to wash sameer areas due to limited standing tolerance and balance. She is able to wipe self while seated on the commode. There is no sink in the bathroom. It has been removed to allow w/c access to the toilet. Ms. Lisa Ortiz reports that she requires assistance with LB ADL to include toileting, LB bathing and LB dressing due to dominique ited space and decreased balance. Will provide ongoing skilled OT visits to assist pt with creating a routine that pt can carryover with increased safety and independence to reduce risk of falls and burden on caregiver.

## 2023-01-17 NOTE — HOME HEALTH
Caregiver involvement: Waldo (spouse) works during the day but is home in the evening to assist pt with ADL and mobility    Medications reviewed and all medications are available in the home this visit. The following education was provided regarding medications, medication interactions, and look alike medications (specify): Continue as directed by MD.    Medications  are effective at this time. Patient education provided this visit: see ADL note    Sharps education provided:  Clinician instructed patient/CG on proper disposal of sharps: Containers should be made of hard plastic, be puncture-resistant and leakproof,   such as a laundry detergent or bleach bottle.  When the container is ¾ full, it should be sealed with tape and labeled   DO NOT RECYCLE prior to discarding in the regular trash.        Patient level of understanding of education provided: see ADL note    Skilled Care Performed this visit: Completed OT evaluation and assessment for safety with ADL and mobility. Patient response to procedure performed:  Ms. Corie Stephens had a positive response to therapy. She denies increased pain with participation today. Patient's Progress towards personal goals: Ms. René Zuleta has guarded potential to progress with therapy due to inability to safely participate with ADL training. She has limited space available in home due to cluttered rooms and walkways, no electricity and no warm water for bathing/ADL training. She currently requires mod A for LB ADL and CGA for safety with transfers. Goal is to achieve SBA for safety with all transfers and ADL.     Home exercise program: B UE strengthening against gravity for shoulder flexion/exension, overhead press, chest press, ab/adduction and elbow flexion/extension    Continued need for the following skills: Nursing, Physical Therapy, Occupational Therapy and Medical Social Work    Discharge Plans:  3w1, 2w1, 1w1 with plans to discharge to self once maximal potential has been achieved with self care and functional mobility in the home setting. CONTINUED NEED FOR THE FOLLOWING SKILLS: HH OT is medically necessary to address barriers of decreased activity tolerance, generalized weakness, pain, decreased standing balance and unsafe living environment. These barriers limit pt's participation in ADL and functional mobility safely and would benefit from continued skilled OT to maximize independende and reduce burden on caregiver. Inpatient Notes         Admission following recent hospitalization for altered mental status related to sepsis and right lower extremity hardware infection. She underwent a right BKA on 11/10/22 and a right AKA on 11/16/22.               DME         Manual wheelchair  Other (specify in comments)  Bath bench  Bath chair  Glucometer Supplies  Crutches

## 2023-01-18 ENCOUNTER — HOME CARE VISIT (OUTPATIENT)
Dept: SCHEDULING | Facility: HOME HEALTH | Age: 53
End: 2023-01-18
Payer: COMMERCIAL

## 2023-01-18 PROCEDURE — G0157 HHC PT ASSISTANT EA 15: HCPCS

## 2023-01-18 PROCEDURE — G0158 HHC OT ASSISTANT EA 15: HCPCS

## 2023-01-18 NOTE — Clinical Note
thanks for the update  ----- Message -----  From: Lali GurrolaYUVAL  Sent: 1/19/2023   6:56 PM EST  To: Lynette Sharp PT      At this time the pt is limited to progression. Her home is extremely cluttered and she continues to not have heat and electricity throughout the day. She has no access to cooking as well. She cannot tolerate WBing to progress towards bumping up and down the stairs. She will benefit from the APS visit to address her social/environmental needs. She will also benefit from improving her general health by a smoking cessation and improving her nutrition. She has improved since beginning HHPT as she is Mod(I) with bed mobility, bed<>WC transfers, and WC mobility in the home, but pain and environmental issues are inhibting further progression at this time. Assessment and Summary of Care:  Patient's current functional status before discharge is as follows    Bed Mobility: mod(I)  Transfers: mod(I) for squat/pivot bed<>WC. Gait/WC mobility: The pt is (I) with WC mobility for household mobility  Stairs: pt unable   Recommendations: Highly recommend focusing on improving general health with also improving her environment. The pt would benefit from improved living situation and increased CG support for nutirition.
82

## 2023-01-19 ENCOUNTER — HOME CARE VISIT (OUTPATIENT)
Dept: SCHEDULING | Facility: HOME HEALTH | Age: 53
End: 2023-01-19
Payer: COMMERCIAL

## 2023-01-19 VITALS
DIASTOLIC BLOOD PRESSURE: 78 MMHG | HEART RATE: 78 BPM | SYSTOLIC BLOOD PRESSURE: 130 MMHG | RESPIRATION RATE: 16 BRPM | TEMPERATURE: 96.5 F | OXYGEN SATURATION: 97 %

## 2023-01-19 PROCEDURE — G0155 HHCP-SVS OF CSW,EA 15 MIN: HCPCS
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
## 2023-01-19 NOTE — HOME HEALTH
SUBJECTIVE: Pt door locked upon arrival, therapist called and pt eventually able to get to the door to open it, she was smoking a cigarette upon opening door at [facility] level. Pt reported she was feeling ok but was very cold as she does not have heat/electricity in the home, MSW has been ordered to try to assist with resources. CAREGIVER ASSISTANCE NEEDED FOR: pt home alone during the day and is without electricity except during 4pm-10pm when  able to hook up genertor to power devices and heat water for bathing. MEDICATIONS REVIEWED AND UPDATED: no medication changes reported this visit. .  OBJECTIVE: see interventions  PATIENT RESPONSE TO TREATMENT:  Pt demonstrated fair response to treatment and is severely limited by home environment, lack of electricity and clutter with progressing towards goals. Horace Hernandez PATIENT/CAREGIVER EDUCATION PROVIDED THIS VISIT: therapist educated pt on importance of dietary intake, increasing her protein and carry over with HEP for strength and improved circulation. Pt also not wearing her  to allow for consistent wearing of prosthetic to improve mobility/safety. PATIENT LEVEL OF UNDERSTANDING OF EDUCATION PROVIDED: Pt verbalized understanding and stated she would try to talk to her  about what he buys from the store but she is not able to go to give input at the time of purchase and feels at mercy of his buying for food. ASSESSMENT AND PROGRESS TOWARD GOALS:  Pt demonstrated good progress with HEP and transfer in bedroom with WC but is limited with mobility due to extreme clutter in home. Patient will benefit from continued intervention with progression of I/ADL, transfer, balance and safety training.   CONTINUED NEED FOR THE FOLLOWING SKILLS: HH OT is medically necessary to address pain, decreased ROM, decreased strength, impaired bed mobility, decreased independence with functional transfers, decreased I with ADLs, and impaired balance in order to improve functional independence, quality of life, return to PLOF, reduce the risk for falls, and reduce pain. PLAN: next visit will be for ADL and continued transfer training for increased safety in home environment. DISCHARGE PLANNING DISCUSSED: Discharge to self and family under MD supervision once all goals have been met or patient has reached maximum potential.  Frequency remaininw1, 2w1, 1w1 remaining.
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
## 2023-01-19 NOTE — CASE COMMUNICATION
At this time the pt is limited to progression. Her home is extremely cluttered and she continues to not have heat and electricity throughout the day. She has no access to cooking as well. She cannot tolerate WBing to progress towards bumping up and down the stairs. She will benefit from the APS visit to address her social/environmental needs. She will also benefit from improving her general health by a smoking cessation and improving he r nutrition. She has improved since beginning HHPT as she is Mod(I) with bed mobility, bed<>WC transfers, and WC mobility in the home, but pain and environmental issues are inhibting further progression at this time. Assessment and Summary of Care:  Patient's current functional status before discharge is as follows    Bed Mobility: mod(I)  Transfers: mod(I) for squat/pivot bed<>WC. Gait/WC mobility: The pt is (I) with WC mobility for  household mobility  Stairs: pt unable   Recommendations: Highly recommend focusing on improving general health with also improving her environment. The pt would benefit from improved living situation and increased CG support for nutirition.

## 2023-01-20 ENCOUNTER — HOME CARE VISIT (OUTPATIENT)
Dept: SCHEDULING | Facility: HOME HEALTH | Age: 53
End: 2023-01-20
Payer: COMMERCIAL

## 2023-01-20 VITALS
RESPIRATION RATE: 18 BRPM | OXYGEN SATURATION: 98 % | SYSTOLIC BLOOD PRESSURE: 138 MMHG | DIASTOLIC BLOOD PRESSURE: 76 MMHG | HEART RATE: 78 BPM | TEMPERATURE: 97.8 F

## 2023-01-20 VITALS
DIASTOLIC BLOOD PRESSURE: 70 MMHG | OXYGEN SATURATION: 97 % | HEART RATE: 86 BPM | RESPIRATION RATE: 14 BRPM | SYSTOLIC BLOOD PRESSURE: 140 MMHG | TEMPERATURE: 96.8 F

## 2023-01-20 PROCEDURE — G0158 HHC OT ASSISTANT EA 15: HCPCS

## 2023-01-20 PROCEDURE — G0151 HHCP-SERV OF PT,EA 15 MIN: HCPCS
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
## 2023-01-20 NOTE — Clinical Note
On [facility] bed mobility was supine to sit and sit to supine with S. On  Re Assessment bed mobility is supine<> sit MOD I. Goal has been met. On [facility] transfers were sliding board transfer bed<> wc with S/SBA increased time needed to complete task. Pt was I with sliding board management . Attempted sit to stand transfer with RW pt was unable to perform secondary to fear of falling, she did not have her L shoe available at time of transfer. Recommeded that it be used on next attempt . On Re Assessment transfers are Modified squat piviot  transfer bed<> wc MOD I demostrated proper technique and sequencing. Today at [place] transfers are  Bump transfer wc<> bed MOD I. Pt refused to perfrom sit to stand transfer today. Standing to step transfers have not been perfromed secondary to pt limited static standing tolerance and WB through L LE. Per PTA visit 1/9/23 PTA education/instruction in sit to stand transfers from the [facility] to the  requiring tactile cues for hand placemnent and UE push off. mod(A) for sit to stand with CGA for standing for 8 secs. CGA for stand to sit. Pt demonstrates bed to [facility] transfer with Mod(I) with good safety awareness for squat/pivot transfers  On [facility] WC skills were  Pt is I with locking and unlocking breaks. Pt was I with swing away arms rests. Pt was I in self propelling wc from bedroom to kitchen using B UE to self propel wc increased time needed to complete task. Pt L LE does not touch the floor when sitting in the WC as pt has 2 cushions in chair. Recommeded the removal of one of the cushions to see if L LE would then touch the floor. Pt refused at this time. Pt was unable to self propel wc to front door secondary to increased clutter in living room. Recommeded for safety that pathway be made so wc and fit through. Recommeded for safety that clutter near pt bed to moved so that she has more room to get wc in closer to bed without having to fight clutter.   On  Re Assessment WC skill are pt was able to I self propell wc using B UE 50ft x 2 from bedroom to the front door and back. Pt was I turning wc around and I with locking and unlocking breaks . On SOC stairs were NA Family bumbs pt up and down steps in wc  On Re Assessment stairs are NA  Today at [place] stairs are Pt refused to perfrom with LPTA. Family is currently carrying pt up and down steps in wc. On SOC Pt was able to maintain a static symmetrical sitting posture on the EOB with U UE support . On ReAssessment balance is Pt was able to maintain a static symetrical sitting posture on the EOB without B UE support MOD I. Pt refused static standing today with walker secondary to pain in L LE. Today at [place] balance is Pt refused to stand today . Pt was able to maintain a static and dynamic sittting posture on the EOB without B UE support  Per PTA visit 1/9/23  CGA for standing for 8 secs. Pt did make some progress but did not met goal of standing for 30 sec . Pt has made progress some and has met some goals. Pt is limited to progression. Her home is extremely cluttered and she continues to not have heat and electricity throughout the day. She has no access to cooking as well. MSW and APS  have both been contatcted and will perfom evaluations. Pt is at base line level of function for current situation. Recommend  for safety that pt cont with sliding board or squat  transfers to wc and to use wc as primary means of mobility for safety   Discharge plan: Discharge from [address] as pt has reached her Max poetential for fuctional gains in current situation. Dr. Maddi Don  office has been notified of DC from [address] as pt is at baseline level of functioin for current situation. l
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
## 2023-01-24 ENCOUNTER — HOME CARE VISIT (OUTPATIENT)
Dept: SCHEDULING | Facility: HOME HEALTH | Age: 53
End: 2023-01-24
Payer: COMMERCIAL

## 2023-01-24 PROCEDURE — G0158 HHC OT ASSISTANT EA 15: HCPCS

## 2023-01-25 VITALS — TEMPERATURE: 106.6 F | OXYGEN SATURATION: 82 % | HEART RATE: 123 BPM

## 2023-01-26 ENCOUNTER — HOME CARE VISIT (OUTPATIENT)
Dept: HOME HEALTH SERVICES | Facility: HOME HEALTH | Age: 53
End: 2023-01-26

## 2023-02-01 ENCOUNTER — HOME CARE VISIT (OUTPATIENT)
Dept: HOME HEALTH SERVICES | Facility: HOME HEALTH | Age: 53
End: 2023-02-01

## 2023-02-06 ENCOUNTER — HOME CARE VISIT (OUTPATIENT)
Dept: HOME HEALTH SERVICES | Facility: HOME HEALTH | Age: 53
End: 2023-02-06

## 2023-02-23 ENCOUNTER — HOME CARE VISIT (OUTPATIENT)
Dept: HOME HEALTH SERVICES | Facility: HOME HEALTH | Age: 53
End: 2023-02-23
Payer: COMMERCIAL

## 2023-02-24 ENCOUNTER — HOME HEALTH ADMISSION (OUTPATIENT)
Age: 53
End: 2023-02-24
Payer: COMMERCIAL

## 2023-02-27 ENCOUNTER — HOME CARE VISIT (OUTPATIENT)
Age: 53
End: 2023-02-27
Payer: COMMERCIAL

## 2023-02-27 ENCOUNTER — HOME CARE VISIT (OUTPATIENT)
Age: 53
End: 2023-02-27

## 2023-02-27 VITALS
RESPIRATION RATE: 18 BRPM | OXYGEN SATURATION: 98 % | SYSTOLIC BLOOD PRESSURE: 140 MMHG | DIASTOLIC BLOOD PRESSURE: 92 MMHG | HEART RATE: 77 BPM | TEMPERATURE: 96 F

## 2023-02-27 PROCEDURE — G0299 HHS/HOSPICE OF RN EA 15 MIN: HCPCS

## 2023-02-27 ASSESSMENT — ENCOUNTER SYMPTOMS
CONSTIPATION: 1
PAIN LOCATION - PAIN QUALITY: STABBING
STOOL DESCRIPTION: FIRM
NAUSEA: 1

## 2023-02-28 ENCOUNTER — HOME CARE VISIT (OUTPATIENT)
Age: 53
End: 2023-02-28
Payer: COMMERCIAL
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
## 2023-03-01 ENCOUNTER — HOME CARE VISIT (OUTPATIENT)
Age: 53
End: 2023-03-01
Payer: COMMERCIAL

## 2023-03-01 VITALS
TEMPERATURE: 98.2 F | RESPIRATION RATE: 17 BRPM | HEART RATE: 62 BPM | OXYGEN SATURATION: 92 % | DIASTOLIC BLOOD PRESSURE: 72 MMHG | SYSTOLIC BLOOD PRESSURE: 138 MMHG

## 2023-03-01 VITALS
RESPIRATION RATE: 17 BRPM | TEMPERATURE: 98.7 F | HEART RATE: 67 BPM | DIASTOLIC BLOOD PRESSURE: 76 MMHG | SYSTOLIC BLOOD PRESSURE: 138 MMHG | OXYGEN SATURATION: 99 %

## 2023-03-01 PROCEDURE — G0151 HHCP-SERV OF PT,EA 15 MIN: HCPCS

## 2023-03-01 PROCEDURE — G0300 HHS/HOSPICE OF LPN EA 15 MIN: HCPCS

## 2023-03-01 ASSESSMENT — ENCOUNTER SYMPTOMS
PAIN LOCATION - PAIN QUALITY: ACHY
STOOL DESCRIPTION: SOFT FORMED
PAIN LOCATION - PAIN QUALITY: ACHE

## 2023-03-01 NOTE — Clinical Note
Thank you.    ----- Message -----  From: Pasquale Collins, PT  Sent: 3/1/2023   9:04 PM EST  To: Jaja Zaragoza LPN, Moe Cyr, OT, *      Patient was seen for home care PT following a referral for hypertension. PMH included a R AKA. She lives in a one story very cluttered house with 4 steep steps to enter and egress the home. She is able to transfer to and from the bed, toilet, and wheelchair with modified independence via a stand pivot transfer. She was able to self propel the wheelchair through house to and from the kitchen, bathroom, and to the front door with independence. She was able to demonstrate standing at the walker with modified independence and was able to take several steps forward using just the left LE - but she reports that she is not interested in working on walking - she is working from a wheelchair level. She was educated on the possibility of a prosthesis to return to walking and she reports that she has not been in touch with a prosthetist at all. This PT provided her with a list of prosthetists in the area that she could contact if she was interested. Discussed egressing the house at length with the patient. She reports that her  has to \"bumps her chair\" down the front steps to egress the house. Discussed concerns over safety with this method - discussed the possibility of a ramp but patient reports that their house was in foreclosure and a ramp is not possible (MSW to see the patient tomorrow for a visit). There also does not seem to be room for a ramp. The railings on the steps are falling off/broken at this time and not safe to use. She was educated on needing to have her  assist her to the ground and she could bump up and down the steps to safely got out of the house in case of emergency. Patient is independent at the wheelchair level and she prefers to stay at a wheelchair level currently. No further home care PT indicated due to independence in the home.  Advised her if they end up looking for new housing, to look toward handicapped accessible housing so that she can safely take care of herself and enter/egress the house in case of emergency - she expressed understanding.

## 2023-03-01 NOTE — HOME HEALTH
S: patient reports that her  is picking up the insulin needles today so she has not taken insulin since she came home Friday   O: PAIN: patient reports pain in the neck and the back - see pain assessment  WOUND:R knee covered in the mepilex bandage  - wound not visible. surrouding the bandage there is moderate swelling without redness. per MD orders did not remove the bandage but educated patient on signs of infection and to NOT remove the bandage   ROM: B LE hip flexion, abduction and adduction WFL  B knee flexion, extension WFL  STRENGTH: R hip flex 4/5, R hip abd/adduction 4/5  L knee flexion and extension 4/5, L hip flexion/abduction and adduction 4/5  BED MOBILITY:independent with bed mobility   EQUIPMENT: w/c, FWW, tub transfer bench/commode  TRANSFERS: mod I with transfers to and from the wheelchair, bed, and the toilet  GAIT: She was able to demonstrate standing at the walker with modified independence and was able to take several steps forward using just the left LE - but she reports that she is not interested in working on walking - she is working from a wheelchair level. STEPS: Discussed egressing the house at length with the patient. She reports that her  has to \"bumps her chair\" down the front steps to egress the house. Discussed concerns over safety with this method - discussed the possibility of a ramp but patient reports that their house was in foreclosure and a ramp is not possible (MSW to see the patient tomorrow for a visit). There also does not seem to be room for a ramp. The railings on the steps are falling off/broken at this time and not safe to use. She was educated on needing to have her  assist her to the ground and she could bump up and down the steps to safely got out of the house in case of emergency.   BALANCE: sitting balance is normal. in standing she requires use of the walker for stability due to R AKA  RESPONSE TO TREATMENT: patient demonstrated a positive outcome post treatment and reported no increase in pain, increased comfort and increased confidence with transfers and mobility. Patient reported good understanding of the HEP and reports confidence in ability to complete the program as prescribed by PT  RESPONSE TO EDUCATION: patient was educated on: safety, need for position changes to avoid pressure sore development, movement every hour to prevent blood clots   Patient expressed understanding of all education provided and was able to repeat back education, precautions, and HEP. A:ASSESSMENT AND PROGRESS TOWARD GOALS:  Patient was seen for home care PT following a referral for hypertension. PMH included a R AKA. She lives in a one story very cluttered house with 4 steep steps to enter and egress the home. She is able to transfer to and from the bed, toilet, and wheelchair with modified independence via a stand pivot transfer. She was able to self propel the wheelchair through house to and from the kitchen, bathroom, and to the front door with independence. She was able to demonstrate standing at the walker with modified independence and was able to take several steps forward using just the left LE - but she reports that she is not interested in working on walking - she is working from a wheelchair level. She was educated on the possibility of a prosthesis to return to walking and she reports that she has not been in touch with a prosthetist at all. This PT provided her with a list of prosthetists in the area that she could contact if she was interested. Discussed egressing the house at length with the patient. She reports that her  has to \"bumps her chair\" down the front steps to egress the house. Discussed concerns over safety with this method - discussed the possibility of a ramp but patient reports that their house was in foreclosure and a ramp is not possible (MSW to see the patient tomorrow for a visit). There also does not seem to be room for a ramp. The railings on the steps are falling off/broken at this time and not safe to use. She was educated on needing to have her  assist her to the ground and she could bump up and down the steps to safely got out of the house in case of emergency. Patient is independent at the wheelchair level and she prefers to stay at a wheelchair level currently. No further home care PT indicated due to independence in the home. Advised her if they end up looking for new housing, to look toward handicapped accessible housing so that she can safely take care of herself and enter/egress the house in case of emergency - she expressed understanding. P: DC PT    Skilled Reason for admission/summary of clinical condition: PER EPIC \"The patient is being seen today due to noted abnormal labs. The patient has noted abnormalities in her hemoglobin hematocrit levels 7.9/23.4 prev 7.6/23.7 which have currently has noted with  slight improvement patient will need to be more compliant with iron supplement. At this time the patient has noted frequent refusals mainly due to constipation. The patient has been started on bowel regimen to prevent this. As well as pt is noted with increase in bun/cr she has current dxof chf diuretic therapy we will cont to monitor and trend current bun/cr 36/2.80 prev 22/2. She is currentlysitting up in w/c in nad. the pt blood glucose levels are ranging 121 thru 279 with current alc 6.0 she is on no current insulin regiment minus ssi with accuchecks we will start the on low dose of long actiing insulin lantus 8 units at hs\"  \"110: HTN (hypertension)    E78.5: Hyperlipidemia, unspecified hyperlipidemia    R53.1: General weakness    K74.60:  Liver cirrhosis    E03.9: Hypothyroid    D50.9: ERIC (iron deficiency anemia)    J18.9: HCAP (healthcare-associated pneumonia) Resolved    E11.9: DM type 2 (diabetes mellitus, type 2) Cont    N18.9: Chronic kidney disease, unspecified CKD stage Cont to closely monitor with avoidance of all nephrotoxic drugs and renal function\"    Caregiver: patient lives with her  in a one story house with 4 steps to enter and egress the house - her home is very cluttered and unkempt but has sufficient pathways to maneuver the wheelchair . her  works during the day and is home at night    Medications reconciled and all medications are available in the home this visit. The following education was provided regarding medications, medication interactions, and look a like medications:   Meds are effective at this time. no discrepancies/changes noted/areported    Home health supplies ordered/delivered this visit include:     Patient education provided: safety, fall risk, HEP. Patient/caregiver degree of understanding:good    Home exercise program: change positions hourly, get up out of bed and spend time in the wheelchair at least 4 times a day    Pt/Caregiver instructed on plan of care and are agreeable to plan of care      Plan of care called to attending MD: Agnes Esparza MD     A written copy of the [facility] of Rights was given and verbally reviewed on this visit. . The patient or representative was given the opportunity to ask pertinent questions regarding the bill of rights. [place] of rights information was received by patient     Discharge planning discussed with patient and caregiver. DC to self and family under MD supervision when all goals are met or maximum potential is reached. Pt/Caregiver did verbalize understanding of DC planning. Next MD appointment TBD with Agnes Esparza MD   Patient/caregiver instructed to keep appointment as lack of follow through with MD appointment could result in discontinuation of home care services for non-compliance.

## 2023-03-02 ENCOUNTER — HOME CARE VISIT (OUTPATIENT)
Age: 53
End: 2023-03-02
Payer: COMMERCIAL

## 2023-03-02 PROCEDURE — G0155 HHCP-SVS OF CSW,EA 15 MIN: HCPCS

## 2023-03-02 NOTE — HOME HEALTH
Skilled reason for visit: Diabetes, Hypertension, CHF, Safety Precautions, Infection Prevention, Skin Impairment Prevention, and Medication Education    Caregiver involvement: Patients spouse prepares meals, schedules appointments, transports patient to and from appointments,  medications, and assist with toileting and bathing if needed. Patient has a daughter that lives nearby and available to assist when needed. Medications reviewed and all medications ARE available in the home this visit. The following education was provided regarding medications:      MD notified of any discrepancies/look a-like medications/medication interactions: NA    Medications are EFFECTIVE at this time. Home health supplies by type and quantity ordered/delivered this visit include: NA    Patient education provided this visit: See interventions Tab. Sharps education provided: Clinician instructed patient/CG on proper disposal of sharps: Containers should be made of hard plastic, be puncture-resistant and leak proof, such as a laundry detergent or bleach bottle.  When the container is ¾ full, it should be sealed with tape and labeled DO NOT RECYCLE prior to discarding in the regular trash. Patient level of understanding of education provided:  Patient verbalized all understanding in interventions tab. Skilled Care Performed this visit: Disease and Medication Education and Management, Safety Precautions and Infection Prevention    Patient response to procedure performed:  Patient tolerated vital assessment well and no facial grimaces or complaints of pain or discomfort. Patient was lying in her bed answering question that this nurse was asking her. Agency Progress toward goals: Agency staff is progressing well with patient as patient verbalizes being able to better manage her disease processes with the education received from home care staff.     Patient's Progress towards personal goals:  Patient is slowly progressing as she was able to verbalize some of the education that was taught regarding her disease process. Home exercise program: Patient will work on decrease her tobacco intake as she understands that smoking is a hazard to her health. Continued need for the following skills: Nursing will continue to follow patient until education is understood and able to be verbalized by patient/caregiver. Plan for next visit: Continue to educate, assess blood sugar log, assess vitals, and review medications      Patient and/or caregiver notified and agrees to changes in the Plan of Care: NA    The following discharge planning was discussed with the pt/caregiver:  when patient reaches goals and medication is managed, and disease processes are understood patient agrees and understand that discharge will take place.
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
## 2023-03-07 ENCOUNTER — HOME CARE VISIT (OUTPATIENT)
Age: 53
End: 2023-03-07
Payer: COMMERCIAL

## 2023-03-08 ENCOUNTER — HOME CARE VISIT (OUTPATIENT)
Age: 53
End: 2023-03-08
Payer: COMMERCIAL

## 2023-03-08 VITALS
HEART RATE: 74 BPM | SYSTOLIC BLOOD PRESSURE: 156 MMHG | RESPIRATION RATE: 18 BRPM | TEMPERATURE: 97.9 F | DIASTOLIC BLOOD PRESSURE: 80 MMHG | OXYGEN SATURATION: 96 %

## 2023-03-08 PROCEDURE — G0300 HHS/HOSPICE OF LPN EA 15 MIN: HCPCS

## 2023-03-08 ASSESSMENT — ENCOUNTER SYMPTOMS
SPUTUM AMOUNT: SCANT
STOOL DESCRIPTION: SOFT FORMED
SPUTUM COLOR: CLEAR
SPUTUM CONSISTENCY: THIN
COUGH CHARACTERISTICS: PRODUCTIVE
COUGH: 1
SPUTUM PRODUCTION: 1

## 2023-03-08 NOTE — HOME HEALTH
Skilled reason for visit: Diabetes, Hypertension, CHF, Safety Precautions, Infection Prevention, Skin Impairment Prevention, and Medication Education    Caregiver involvement: Patients spouse prepares meals, schedules appointments, transports patient to and from appointments,  medications, and assist with toileting and bathing if needed. Patient has a daughter that lives nearby and available to assist when needed. Medications reviewed and all medications ARE available in the home this visit. The following education was provided regarding medications: Norvasc-Patient/Caregiver was educated on this medication and the purpose of it. Patient/Caregiver verbalized understanding    MD notified of any discrepancies/look a-like medications/medication interactions: NA    Medications are EFFECTIVE at this time. Home health supplies by type and quantity ordered/delivered this visit include: NA    Patient education provided this visit: See interventions Tab. Sharps education provided: Clinician instructed patient/CG on proper disposal of sharps: Containers should be made of hard plastic, be puncture-resistant and leak proof, such as a laundry detergent or bleach bottle. When the container is ¾ full, it should be sealed with tape and labeled DO NOT RECYCLE prior to discarding in the regular trash. Patient level of understanding of education provided:  Patient verbalized all understanding in interventions tab. Skilled Care Performed this visit: Disease and Medication Education and Management, Safety Precautions and Infection Prevention    Patient response to procedure performed:  Patient tolerated vital assessment well and no facial grimaces or complaints of pain or discomfort. Patient was lying in her bed answering question that this nurse was asking her.     Agency Progress toward goals: Agency staff is progressing well with patient as patient verbalizes being able to better manage her disease processes with the education received from home care staff. Patient's Progress towards personal goals:  Patient is slowly progressing as she was able to verbalize some of the education that was taught regarding her disease process. Home exercise program: Patient will work be sure to check and record blood sugars daily and take insulin as ordered. Continued need for the following skills: Nursing will continue to follow patient until education is understood and able to be verbalized by patient/caregiver. Plan for next visit: Continue to educate, assess blood sugar log, assess vitals, and review medications    Patient and/or caregiver notified and agrees to changes in the Plan of Care: NA    The following discharge planning was discussed with the pt/caregiver:  when patient reaches goals and medication is managed, and disease processes are understood patient agrees and understand that discharge will take place.

## 2023-03-15 ENCOUNTER — HOME CARE VISIT (OUTPATIENT)
Age: 53
End: 2023-03-15
Payer: COMMERCIAL

## 2023-03-15 PROCEDURE — G0152 HHCP-SERV OF OT,EA 15 MIN: HCPCS

## 2023-03-15 NOTE — Clinical Note
Thank you.    ----- Message -----  From: Ingris Javier, OT  Sent: 3/16/2023   8:12 AM EDT  To: Deborah Yu, PT, Kole Adair LPN, *      Occupational Therapy Evaluation    1w1    Ms. Richardson uses a w/c for primary mobility due to history of R AKA. She has a tub transfer bench with cut out for toileting purposes. During the day, this is set over the toilet and pt is able to transfer on and off from w/c level independently. She is able to pivot onto set and scoot down to commode opening unassisted. She stand with support of a bar and is able to pull clothing up/down unassisted and manages her own hygiene. Ms. Rosemarie Ayoub states that her  will move the bench to the tub 2 times a week for her to transfer in and out of the shower for bathing. She reports being able to wash self unassisted in the shower but states that spouse is close by for safety. She is able to sponge bathe self independently on other days at w/c level. She uses counter top for balance when washing [place] areas. Ms. Rosemarie Ayoub is able to gather her own clothing and dress self. She has a sock aide but denies needing it any longer. Ms Rosemarie Ayoub is able to access her kitchen independently at the w/c level and access the sink and fridge to access medications and food. At this time, Ms. Rosemarie Ayoub reports that she has returned to her baseline. No further skilled OT is indicated and pt in agreement.

## 2023-03-15 NOTE — HOME HEALTH
Caregiver involvement: Bisi Estrada (spouse) lives with pt and provides daily emotional support. Medications reviewed and all medications are available in the home this visit. The following education was provided regarding medications, medication interactions, and look alike medications (specify): continue as directed by MD.    Medications  are effective at this time. Patient education provided this visit: see ADL note    Sharps education provided:  Clinician instructed patient/CG on proper disposal of sharps: Containers should be made of hard plastic, be puncture-resistant and leakproof,   such as a laundry detergent or bleach bottle.  When the container is ¾ full, it should be sealed with tape and labeled   DO NOT RECYCLE prior to discarding in the regular trash.        Patient level of understanding of education provided: see ADL note    Skilled Care Performed this visit: Completed OT evaluation and assessment for safety with ADL and mobility. Patient response to procedure performed:  Ms. Turner had a positive response to therapy. She denies having pain that limits participation. Patient's Progress towards personal goals: Ms. Turner has good rehab potential and has returned to independence with ADL and mobility at the w/c level. Home exercise program: na    Continued need for the following skills: skilled nursing    Discharge Plans:  1w1  with plans to discharge to self. No further skilled OT is indicated at this time as pt has returned to PLOF. Will notify MD of OT d/c. Inpatient Notes         PROGRESS NOTE   HISTORY   Code Status:   Do Not Attempt Resuscitation (DNR/no CPR). Chief Complaint / Neftaly Raúl of Presenting Problem: The pt is being seen today due noted abnormal labs   Room:   166b   History Of Present Illness: The patient is being seen today due to noted abnormal labs.  The patient has noted abnormalities in her hemoglobin hematocrit levels 7.9/23.4 prev 7.6/23.7 which have currently has noted with slight improvement patient will need to be more compliant with iron supplement. At this time the patient has noted frequent refusals mainly due to constipation. The patient has been started on bowel regimen to prevent this. As well as pt is noted with increase in bun/cr she has current dxof chf diuretic therapy we will cont to monitor and trend current bun/cr 36/2.80 prev 22/2. She is currentlysitting up in w/c in nad. the pt blood glucose levels are ranging 121 thru 279 with current alc 6.0 she is on no current insulin regiment minus ssi with accuchecks we will start the on low dose of long actiing insulin lantus 8 units at hs   Albuterol sulfate 2 puffs inhale orally every 24 hours as needed Norvasc 10 mg 1 tab p.o. daily   Lipitor 40 mg 1 tab p.o. daily   Calcium 600+ D3 1 tab p.o. twice daily   Flexeril 10 mg 1 tab p.o. every 8 hours as nee ded ergocalciferol 50,000 units 1 capsule p.o. daily Ferrous sulfate 325 1 tab p.o. twice daily every other day Fluticasone 50 mcg 2 sprays both nostrils every 24 as needed   Medication List:   Zyrtec 10 mg 1 tab p.o. daily   Lasix 20 mg 1 tab p.o. daily Synthroid 150 mcg 1 tab p.o. in a.m. Melatonin 5 mg 4 tabs p.o. at bedtime   MiraLAX 17 g 1 tab p.o. every other day Lyrica 75 mg 1 tab p.o. twice daily   Sodium bicarbon ate 650 mg 1 tab p.o. twice daily   Therapeutic multivitamins I tab p.o, daily Thiamine 100 mg 1 tab p.o. daily   Ambien 10 mg 1 tab p.o. every 24 hours as needed   Allergy List: No known medication allergies. Plan:   1. Continue with physical and Occupational Therapy and speech therapy, if warranted. 2. Discharge planning to home with home health services. Durable medical equipment will be determined closer to actu al discharge per the recommendations of therapy and patient need. 3. Follow-up with specialties to be scheduled by facility's :   4. Manage acute and chronic illnesses to prevent rehospitalizations.    5. Obtain baseline laboratory work CBC, BMP;   6. History and physical, transfer summary, medications, orders, laboratory work, diagnostic testing have been reviewed.                 DME         Front wheeled walker  Manual wheelchair  3-in-1 Dolores Patel

## 2023-03-16 ENCOUNTER — HOME CARE VISIT (OUTPATIENT)
Age: 53
End: 2023-03-16
Payer: COMMERCIAL

## 2023-03-16 VITALS
HEART RATE: 74 BPM | DIASTOLIC BLOOD PRESSURE: 88 MMHG | RESPIRATION RATE: 17 BRPM | SYSTOLIC BLOOD PRESSURE: 146 MMHG | TEMPERATURE: 97.4 F | OXYGEN SATURATION: 99 %

## 2023-03-16 PROCEDURE — G0300 HHS/HOSPICE OF LPN EA 15 MIN: HCPCS

## 2023-03-16 NOTE — CASE COMMUNICATION
Occupational Therapy Evaluation    1w1    Ms. Richardson uses a w/c for primary mobility due to history of R AKA. She has a tub transfer bench with cut out for toileting purposes. During the day, this is set over the toilet and pt is able to transfer on and off from w/c level independently. She is able to pivot onto set and scoot down to commode opening unassisted. She stand with support of a bar and is able to pull clothing up/down unas sisted and manages her own hygiene. Ms. Nito Newell states that her  will move the bench to the tub 2 times a week for her to transfer in and out of the shower for bathing. She reports being able to wash self unassisted in the shower but states that spouse is close by for safety. She is able to sponge bathe self independently on other days at w/c level. She uses counter top for balance when washing [place] areas. Ms. Nito Newell is able to  gather her own clothing and dress self. She has a sock aide but denies needing it any longer. Ms Nito Newell is able to access her kitchen independently at the w/c level and access the sink and fridge to access medications and food. At this time, Ms. Nito Newell reports that she has returned to her baseline. No further skilled OT is indicated and pt in agreement.

## 2023-03-20 ENCOUNTER — HOME CARE VISIT (OUTPATIENT)
Age: 53
End: 2023-03-20
Payer: COMMERCIAL

## 2023-03-20 PROCEDURE — G0300 HHS/HOSPICE OF LPN EA 15 MIN: HCPCS

## 2023-03-22 VITALS
RESPIRATION RATE: 18 BRPM | TEMPERATURE: 98.1 F | SYSTOLIC BLOOD PRESSURE: 134 MMHG | DIASTOLIC BLOOD PRESSURE: 59 MMHG | HEART RATE: 72 BPM | OXYGEN SATURATION: 98 % | OXYGEN SATURATION: 98 % | TEMPERATURE: 98.2 F | SYSTOLIC BLOOD PRESSURE: 134 MMHG | RESPIRATION RATE: 20 BRPM | DIASTOLIC BLOOD PRESSURE: 58 MMHG | HEART RATE: 68 BPM

## 2023-03-22 NOTE — HOME HEALTH
Skilled reason for visit: HTN  Caregiver involvement: spouse available for assistance as needed. Medications reviewed and all medications are available in the home this visit. The following education was provided regarding medications, medication interactions, and look alike medications (specify): no med changes noted or reported. Medications  are effective at this time. Home health supplies by type and quantity ordered/delivered this visit include: none  Patient education provided this visit:.SN instruct on medication compliance to better control the patients disease process, to refill medication on time to prevent missed/skipped doses. Also Instructed to pt to take each medication exactly as it has been prescribed, make sure that all your doctors know about all your medications, and let your doctors know about any other over the counter (OTC) medications, vitamins and supplements, or herbs that you use as well as any allergies to any medication. Progress toward goals: .pt progressing toward improving health with taking all medications as prescribed and keeping all medical appts. pt verbalizes understanding of education provided      Home exercise program: Follow heart healthy diet with fresh veggies and lean cuts of meat, with no or low added salt and sugar.       Continued need for the following skills: ready for discharge visit  Patient and/or caregiver notified and agrees to changes in the Plan of Care n/a  Patient will be discharged once education and wounds if applicable has been completed, patient is medically stable, and all goals met

## 2023-03-22 NOTE — HOME HEALTH
Skilled reason for visit: HTN  Caregiver involvement: spouse in home and assist as needed. Medications reviewed and all medications are available in the home this visit. The following education was provided regarding medications, medication interactions, and look alike medications (specify): no med changes reported or noted. Medications  are effective at this time. Home health supplies by type and quantity ordered/delivered this visit include: none  Patient education provided this visit:Follow heart healthy diet with fresh veggies and lean cuts of meat, with no or low added salt and sugar. Progress toward goals: .pt progressing toward improving health with taking all medications as prescribed and keeping all medical appts.  pt verbalizes understanding of education provided      Home exercise program: Patient was instructed on strategies that can significantly help decrease the risk of a fall such as: Good lighting throughout the home, especially in stairwells and hallways, Non-slip floors and rugs, Hand rails on stairs, next to the toilet and in the shower and bathtub    Continued need for the following skills: nursing  Patient and/or caregiver notified and agrees to changes in the Plan of Care n/a  Patient will be discharged once education and wounds if applicable has been completed, patient is medically stable, and all goals met

## 2023-03-27 ENCOUNTER — HOME CARE VISIT (OUTPATIENT)
Age: 53
End: 2023-03-27
Payer: COMMERCIAL

## 2023-03-29 ENCOUNTER — HOME CARE VISIT (OUTPATIENT)
Age: 53
End: 2023-03-29

## 2023-03-30 ENCOUNTER — HOME CARE VISIT (OUTPATIENT)
Age: 53
End: 2023-03-30

## 2023-09-13 ENCOUNTER — HOME HEALTH ADMISSION (OUTPATIENT)
Age: 53
End: 2023-09-13